# Patient Record
Sex: MALE | URBAN - METROPOLITAN AREA
[De-identification: names, ages, dates, MRNs, and addresses within clinical notes are randomized per-mention and may not be internally consistent; named-entity substitution may affect disease eponyms.]

---

## 2024-10-08 VITALS
OXYGEN SATURATION: 97 % | SYSTOLIC BLOOD PRESSURE: 122 MMHG | HEIGHT: 66 IN | HEART RATE: 59 BPM | DIASTOLIC BLOOD PRESSURE: 66 MMHG | WEIGHT: 169.98 LBS

## 2024-10-08 NOTE — H&P ADULT - HISTORY OF PRESENT ILLNESS
Cardiologist: Dr. Zeferino Kennedy  Pharmacy:  Escort:    69M Cantonese speaking, current smoker, PMHx HTN, HLD, T2DM, mod AS, thoracic aortic ectasia, and abdominal aortic ectasia, who initially presented to his outpatient cardiologist c/o CP. He describes his pain as intermittent, non-radiating, L sided chest "pressure," occurring both w/ exertion and at rest. He explains that these episodes occur for a few minutes and resolve spontaneously. Pt also endorses SHAY w/ exercise. Of note, pt had a CXR showing coronary calcification. Pt currently denies any orthopnea, PND, palpitations, dizziness, lightheadedness, syncope, diaphoresis, LE edema, N/V/D, hematochezia, melena, hematuria, hematemesis, fever, chills, URI symptoms.     - CCTA (8/7/24): CA score 559 (74%ile). Aortic valve CA 1734. Severe mixed dz in pLAD. Mod mixed dz in mLAD. Severe mixed dz pLCx. Remaining non-obstructive. GABRIEL ~2.8 cm.   - TTE (8/14/24): LVEF 55-60%. Mild cLVH. GIDD. Mod AS. Mild AR. AV Pk Grad 45.7 mmHg, AV mean grad 22.2 mmHg, AV Pk allison 2.98 m/s, aortic valve area (VTI) calculated area 1.7 cm^2, GABRIEL/BSA 0.87 cm^2/m^2. Trace MR. Ectasia in aortic root. Borderline dilated ascending aorta.   - Abdominal aorta study (8/14/24): Diffuse plaque observed in the prox, mid, distal aorta. Diffuse plaque observed in right and left ICA. Borderline ectasia in prox abd aorta (2.5 cm). Dilation in mid abd aorta (2.1 cm). Dilation in left common iliac artery (1.3 cm). Mildly elevated velocity in the right common iliac artery and B/L ext iliac arteries. Mod elevated velocity in the left common iliac artery. Patent aorta, common iliac, and external iliac arteries w/ biphasic flow B/L.    Cardiologist: Dr. Zeferino Kennedy  Pharmacy:  Escort:    69M Cantonese speaking, current smoker, PMHx HTN, HLD, T2DM, mod AS, thoracic aortic ectasia, and abdominal aortic ectasia, who initially presented to his outpatient cardiologist c/o GUADALUPE. He describes his pain as intermittent, non-radiating, L sided chest "pressure," occurring both w/ exertion and at rest. He explains that these episodes occur for a few minutes and resolve spontaneously. Pt also endorses SHAY w/ exercise. Of note, pt had a CXR showing coronary calcification. Pt currently denies any orthopnea, PND, palpitations, dizziness, lightheadedness, syncope, diaphoresis, LE edema, N/V/D, hematochezia, melena, hematuria, hematemesis, fever, chills, URI symptoms.     - CCTA (8/7/24): CA score 559 (74%ile). Aortic valve CA 1734. Severe mixed dz in pLAD. Mod mixed dz in mLAD. Severe mixed dz pLCx. Remaining non-obstructive. GABRIEL ~2.8 cm.   - TTE (8/14/24): LVEF 55-60%. Mild cLVH. GIDD. Mod AS. Mild AR. AV Pk Grad 45.7 mmHg, AV mean grad 22.2 mmHg, AV Pk allison 2.98 m/s, aortic valve area (VTI) calculated area 1.7 cm^2, GABRIEL/BSA 0.87 cm^2/m^2. Trace MR. Ectasia in aortic root. Borderline dilated ascending aorta.   - Abdominal aorta study (8/14/24): Diffuse plaque observed in the prox, mid, distal aorta. Diffuse plaque observed in right and left ICA. Borderline ectasia in prox abd aorta (2.5 cm). Dilation in mid abd aorta (2.1 cm). Dilation in left common iliac artery (1.3 cm). Mildly elevated velocity in the right common iliac artery and B/L ext iliac arteries. Mod elevated velocity in the left common iliac artery. Patent aorta, common iliac, and external iliac arteries w/ biphasic flow B/L.     In light of pt risk factors, CCS class II anginal symptoms/anginal equivalents, and abnormal CCTA, pt now presents to [facility] for recommended cardiac catheterization w/ possible intervention if clinically indicated.  Cardiologist: Dr. Zeferino Kennedy  Pharmacy: [facility]  Escort: Daughter    69M Cantonese speaking, current smoker, PMHx HTN, HLD, T2DM, mod AS, thoracic aortic ectasia, and abdominal aortic ectasia, who initially presented to his outpatient cardiologist c/o GUADALUPE. He describes his pain as intermittent, non-radiating, L sided chest "pressure," occurring both w/ exertion and at rest. He explains that these episodes occur for a few minutes and resolve spontaneously. Pt also endorses SHAY w/ exercise. Of note, pt had a CXR showing coronary calcification. Pt also complains of feeling lightheaded after sitting down and then standing. Other Pt currently denies any orthopnea, PND, palpitations, syncope, diaphoresis, LE edema, N/V/D, hematochezia, melena, hematuria, hematemesis, fever, chills, URI symptoms.     - CCTA (8/7/24): CA score 559 (74%ile). Aortic valve CA 1734. Severe mixed dz in pLAD. Mod mixed dz in mLAD. Severe mixed dz pLCx. Remaining non-obstructive. GABRIEL ~2.8 cm.   - TTE (8/14/24): LVEF 55-60%. Mild cLVH. GIDD. Mod AS. Mild AR. AV Pk Grad 45.7 mmHg, AV mean grad 22.2 mmHg, AV Pk allison 2.98 m/s, aortic valve area (VTI) calculated area 1.7 cm^2, GABRIEL/BSA 0.87 cm^2/m^2. Trace MR. Ectasia in aortic root. Borderline dilated ascending aorta.   - Abdominal aorta study (8/14/24): Diffuse plaque observed in the prox, mid, distal aorta. Diffuse plaque observed in right and left ICA. Borderline ectasia in prox abd aorta (2.5 cm). Dilation in mid abd aorta (2.1 cm). Dilation in left common iliac artery (1.3 cm). Mildly elevated velocity in the right common iliac artery and B/L ext iliac arteries. Mod elevated velocity in the left common iliac artery. Patent aorta, common iliac, and external iliac arteries w/ biphasic flow B/L.     In light of pt risk factors, CCS class II anginal symptoms/anginal equivalents, and abnormal CCTA, pt now presents to [facility] for recommended cardiac catheterization w/ possible intervention if clinically indicated.

## 2024-10-08 NOTE — H&P ADULT - SPO2 (%)
Permission received to review discharge instructions and discuss private health information with  and will have someone with them after discharge   Patient states that family/friend will be with them for at least 24 hours following today's procedure.      Air Warming blanket placed on pt; turned on for comfort 97

## 2024-10-08 NOTE — H&P ADULT - ASSESSMENT
9M Cantonese speaking, current smoker, PMHx HTN, HLD, T2DM, mod AS, thoracic aortic ectasia, and abdominal aortic ectasia, who initially presented to his outpatient cardiologist c/o GUADALUPE. He describes his pain as intermittent, non-radiating, L sided chest "pressure," occurring both w/ exertion and at rest. He explains that these episodes occur for a few minutes and resolve spontaneously. Pt also endorses SHAY w/ exercise. Of note, pt had a CXR showing coronary calcification. Pt also complains of feeling lightheaded after sitting down and then standing. In light of pt risk factors, CCS class II anginal symptoms/anginal equivalents, and abnormal CCTA, pt now presents to [facility] for recommended cardiac catheterization w/ possible intervention if clinically indicated.         ASA III          Mallampati III  Patient is a candidate for sedation: yes  Sedation: Moderate    Risks & benefits of procedure and alternative therapy have been explained to the patient including but not limited to: allergic reaction, bleeding w/possible need for blood transfusion, infection, renal and vascular compromise, limb damage, arrhythmia, stroke, vessel dissection/perforation, Myocardial infarction, emergent CABG. Informed consent obtained and in chart.       Patient denies bleeding, BRBPR, melena, hematuria, hematemesis.        cc Bolus IV x 1 over 1 hour followed by NS 75 cc/hr x 2 hrs per Hydration Protocol. Per Echo (8/14/24) Pt has mod A.S but has normal LVEF (55-60%). No evidence of fluid overload. No evidence of JVD, pulmonary congestion, LE edema, or ascites.    - Pt endorses being prescribed ASA x2 years ago. He states that he is not compliant with taking his Aspirin every day due to developing occasional epistaxis. States that he last took his ASA 81 medication x3 days ago. Pt loaded with  mg x1. No plavix given prior to cath, discussed with . H&H, coags, LFTs, and platelets WNL.    9M Cantonese speaking, current smoker, PMHx HTN, HLD, T2DM, mod AS, thoracic aortic ectasia, and abdominal aortic ectasia, who initially presented to his outpatient cardiologist c/o GUADALUPE. He describes his pain as intermittent, non-radiating, L sided chest "pressure," occurring both w/ exertion and at rest. He explains that these episodes occur for a few minutes and resolve spontaneously. Pt also endorses SHAY w/ exercise. Of note, pt had a CXR showing coronary calcification. Pt also complains of feeling lightheaded after sitting down and then standing. In light of pt risk factors, CCS class II-III anginal symptoms/anginal equivalents, and abnormal CCTA, pt now presents to [facility] for recommended cardiac catheterization w/ possible intervention if clinically indicated.         ASA III          Mallampati III  Patient is a candidate for sedation: yes  Sedation: Moderate    Risks & benefits of procedure and alternative therapy have been explained to the patient including but not limited to: allergic reaction, bleeding w/possible need for blood transfusion, infection, renal and vascular compromise, limb damage, arrhythmia, stroke, vessel dissection/perforation, Myocardial infarction, emergent CABG. Informed consent obtained and in chart.       Patient denies bleeding, BRBPR, melena, hematuria, hematemesis.        cc Bolus IV x 1 over 1 hour followed by NS 75 cc/hr x 2 hrs per Hydration Protocol. Per Echo (8/14/24) Pt has mod A.S but has normal LVEF (55-60%). No evidence of fluid overload. No evidence of JVD, pulmonary congestion, LE edema, or ascites.    - Pt endorses being prescribed ASA x2 years ago. He states that he is not compliant with taking his Aspirin every day due to developing occasional epistaxis. States that he last took his ASA 81 medication x3 days ago. Pt loaded with  mg x1. No plavix given prior to cath, discussed with . H&H, coags, LFTs, and platelets WNL.  Of note, the patient  did have epistaxis after taking ASA for 10 days lately and has been taking it on and off.  However, he denies itchiness, swelling or SOB with ASA.  Clinical history is not consistent ASA allergy.  Given his anatomy of multi-vessel CAD and epistaxis on ASA, treatment options including continue medical therapy vs CABG vs PCI discussed with pt and family again at bedside.   They expressed strong preference for PCI over CABG.  He also promised to continue ASA and Plavix if PCI is performed.

## 2024-10-08 NOTE — H&P ADULT - CARDIOVASCULAR
normal/regular rate and rhythm/S1 S2 present/no gallops/no rub/no JVD/no pedal edema/murmur/vascular

## 2024-10-08 NOTE — H&P ADULT - NSICDXPASTMEDICALHX_GEN_ALL_CORE_FT
PAST MEDICAL HISTORY:  History of nicotine dependence     HLD (hyperlipidemia)     HTN (hypertension)     T2DM (type 2 diabetes mellitus)

## 2024-10-10 ENCOUNTER — OUTPATIENT (OUTPATIENT)
Dept: OUTPATIENT SERVICES | Facility: HOSPITAL | Age: 69
LOS: 1 days | Discharge: HOME | End: 2024-10-10
Payer: MEDICARE

## 2024-10-10 LAB
A1C WITH ESTIMATED AVERAGE GLUCOSE RESULT: 6.6 % — HIGH (ref 4–5.6)
ALBUMIN SERPL ELPH-MCNC: 4.4 G/DL — SIGNIFICANT CHANGE UP (ref 3.3–5)
ALP SERPL-CCNC: 68 U/L — SIGNIFICANT CHANGE UP (ref 40–120)
ALT FLD-CCNC: 19 U/L — SIGNIFICANT CHANGE UP (ref 10–45)
ANION GAP SERPL CALC-SCNC: 9 MMOL/L — SIGNIFICANT CHANGE UP (ref 5–17)
APTT BLD: 30.7 SEC — SIGNIFICANT CHANGE UP (ref 24.5–35.6)
AST SERPL-CCNC: 15 U/L — SIGNIFICANT CHANGE UP (ref 10–40)
BASOPHILS # BLD AUTO: 0.05 K/UL — SIGNIFICANT CHANGE UP (ref 0–0.2)
BASOPHILS NFR BLD AUTO: 0.8 % — SIGNIFICANT CHANGE UP (ref 0–2)
BILIRUB SERPL-MCNC: 0.6 MG/DL — SIGNIFICANT CHANGE UP (ref 0.2–1.2)
BUN SERPL-MCNC: 26 MG/DL — HIGH (ref 7–23)
CALCIUM SERPL-MCNC: 9.5 MG/DL — SIGNIFICANT CHANGE UP (ref 8.4–10.5)
CHLORIDE SERPL-SCNC: 107 MMOL/L — SIGNIFICANT CHANGE UP (ref 96–108)
CHOLEST SERPL-MCNC: 169 MG/DL — SIGNIFICANT CHANGE UP
CK MB CFR SERPL CALC: <1 NG/ML — SIGNIFICANT CHANGE UP (ref 0–6.7)
CK SERPL-CCNC: 36 U/L — SIGNIFICANT CHANGE UP (ref 30–200)
CO2 SERPL-SCNC: 27 MMOL/L — SIGNIFICANT CHANGE UP (ref 22–31)
CREAT SERPL-MCNC: 1.08 MG/DL — SIGNIFICANT CHANGE UP (ref 0.5–1.3)
EGFR: 75 ML/MIN/1.73M2 — SIGNIFICANT CHANGE UP
EOSINOPHIL # BLD AUTO: 0.21 K/UL — SIGNIFICANT CHANGE UP (ref 0–0.5)
EOSINOPHIL NFR BLD AUTO: 3.4 % — SIGNIFICANT CHANGE UP (ref 0–6)
ESTIMATED AVERAGE GLUCOSE: 143 MG/DL — HIGH (ref 68–114)
GLUCOSE BLDC GLUCOMTR-MCNC: 120 MG/DL — HIGH (ref 70–99)
GLUCOSE BLDC GLUCOMTR-MCNC: 136 MG/DL — HIGH (ref 70–99)
GLUCOSE SERPL-MCNC: 137 MG/DL — HIGH (ref 70–99)
HCT VFR BLD CALC: 44.4 % — SIGNIFICANT CHANGE UP (ref 39–50)
HDLC SERPL-MCNC: 63 MG/DL — SIGNIFICANT CHANGE UP
HGB BLD-MCNC: 14.4 G/DL — SIGNIFICANT CHANGE UP (ref 13–17)
IMM GRANULOCYTES NFR BLD AUTO: 1.1 % — HIGH (ref 0–0.9)
INR BLD: 0.89 — SIGNIFICANT CHANGE UP (ref 0.85–1.16)
LIPID PNL WITH DIRECT LDL SERPL: 81 MG/DL — SIGNIFICANT CHANGE UP
LYMPHOCYTES # BLD AUTO: 2.49 K/UL — SIGNIFICANT CHANGE UP (ref 1–3.3)
LYMPHOCYTES # BLD AUTO: 40.7 % — SIGNIFICANT CHANGE UP (ref 13–44)
MAGNESIUM SERPL-MCNC: 2.4 MG/DL — SIGNIFICANT CHANGE UP (ref 1.6–2.6)
MCHC RBC-ENTMCNC: 30.6 PG — SIGNIFICANT CHANGE UP (ref 27–34)
MCHC RBC-ENTMCNC: 32.4 GM/DL — SIGNIFICANT CHANGE UP (ref 32–36)
MCV RBC AUTO: 94.5 FL — SIGNIFICANT CHANGE UP (ref 80–100)
MONOCYTES # BLD AUTO: 0.57 K/UL — SIGNIFICANT CHANGE UP (ref 0–0.9)
MONOCYTES NFR BLD AUTO: 9.3 % — SIGNIFICANT CHANGE UP (ref 2–14)
NEUTROPHILS # BLD AUTO: 2.73 K/UL — SIGNIFICANT CHANGE UP (ref 1.8–7.4)
NEUTROPHILS NFR BLD AUTO: 44.7 % — SIGNIFICANT CHANGE UP (ref 43–77)
NON HDL CHOLESTEROL: 106 MG/DL — SIGNIFICANT CHANGE UP
NRBC # BLD: 0 /100 WBCS — SIGNIFICANT CHANGE UP (ref 0–0)
PLATELET # BLD AUTO: 237 K/UL — SIGNIFICANT CHANGE UP (ref 150–400)
POTASSIUM SERPL-MCNC: 4.2 MMOL/L — SIGNIFICANT CHANGE UP (ref 3.5–5.3)
POTASSIUM SERPL-SCNC: 4.2 MMOL/L — SIGNIFICANT CHANGE UP (ref 3.5–5.3)
PROT SERPL-MCNC: 7.4 G/DL — SIGNIFICANT CHANGE UP (ref 6–8.3)
PROTHROM AB SERPL-ACNC: 10.3 SEC — SIGNIFICANT CHANGE UP (ref 9.9–13.4)
RBC # BLD: 4.7 M/UL — SIGNIFICANT CHANGE UP (ref 4.2–5.8)
RBC # FLD: 13.2 % — SIGNIFICANT CHANGE UP (ref 10.3–14.5)
SODIUM SERPL-SCNC: 143 MMOL/L — SIGNIFICANT CHANGE UP (ref 135–145)
TRIGL SERPL-MCNC: 145 MG/DL — SIGNIFICANT CHANGE UP
WBC # BLD: 6.12 K/UL — SIGNIFICANT CHANGE UP (ref 3.8–10.5)
WBC # FLD AUTO: 6.12 K/UL — SIGNIFICANT CHANGE UP (ref 3.8–10.5)

## 2024-10-10 PROCEDURE — 82550 ASSAY OF CK (CPK): CPT

## 2024-10-10 PROCEDURE — 80061 LIPID PANEL: CPT

## 2024-10-10 PROCEDURE — 80053 COMPREHEN METABOLIC PANEL: CPT

## 2024-10-10 PROCEDURE — 93005 ELECTROCARDIOGRAM TRACING: CPT

## 2024-10-10 PROCEDURE — 93458 L HRT ARTERY/VENTRICLE ANGIO: CPT

## 2024-10-10 PROCEDURE — 36415 COLL VENOUS BLD VENIPUNCTURE: CPT

## 2024-10-10 PROCEDURE — 83735 ASSAY OF MAGNESIUM: CPT

## 2024-10-10 PROCEDURE — 82962 GLUCOSE BLOOD TEST: CPT

## 2024-10-10 PROCEDURE — 85025 COMPLETE CBC W/AUTO DIFF WBC: CPT

## 2024-10-10 PROCEDURE — C1894: CPT

## 2024-10-10 PROCEDURE — 85610 PROTHROMBIN TIME: CPT

## 2024-10-10 PROCEDURE — C1887: CPT

## 2024-10-10 PROCEDURE — 82553 CREATINE MB FRACTION: CPT

## 2024-10-10 PROCEDURE — 93010 ELECTROCARDIOGRAM REPORT: CPT

## 2024-10-10 PROCEDURE — C1769: CPT

## 2024-10-10 PROCEDURE — 83036 HEMOGLOBIN GLYCOSYLATED A1C: CPT

## 2024-10-10 PROCEDURE — 85730 THROMBOPLASTIN TIME PARTIAL: CPT

## 2024-10-10 PROCEDURE — 85347 COAGULATION TIME ACTIVATED: CPT

## 2024-10-10 PROCEDURE — 93799 UNLISTED CV SVC/PROCEDURE: CPT

## 2024-10-10 RX ORDER — SODIUM CHLORIDE 0.9 % (FLUSH) 0.9 %
1000 SYRINGE (ML) INJECTION
Refills: 0 | Status: ACTIVE | OUTPATIENT
Start: 2024-10-10 | End: 2024-10-10

## 2024-10-10 RX ORDER — NEBIVOLOL 2.5 MG/1
1 TABLET ORAL
Refills: 0 | DISCHARGE

## 2024-10-10 RX ORDER — METFORMIN HCL 500 MG
1 TABLET ORAL
Refills: 0 | DISCHARGE

## 2024-10-10 RX ORDER — ASPIRIN 325 MG
81 TABLET ORAL ONCE
Refills: 0 | Status: DISCONTINUED | OUTPATIENT
Start: 2024-10-10 | End: 2024-10-10

## 2024-10-10 RX ORDER — ROSUVASTATIN CALCIUM 20 MG/1
1 TABLET, COATED ORAL
Refills: 0 | DISCHARGE

## 2024-10-10 RX ORDER — ASPIRIN 325 MG
325 TABLET ORAL ONCE
Refills: 0 | Status: COMPLETED | OUTPATIENT
Start: 2024-10-10 | End: 2024-10-10

## 2024-10-10 RX ORDER — CHLORHEXIDINE GLUCONATE ORAL RINSE 1.2 MG/ML
1 SOLUTION DENTAL ONCE
Refills: 0 | Status: ACTIVE | OUTPATIENT
Start: 2024-10-10

## 2024-10-10 RX ORDER — ASPIRIN 325 MG
1 TABLET ORAL
Refills: 0 | DISCHARGE

## 2024-10-10 RX ORDER — AMLODIPINE BESYLATE 5 MG
1 TABLET ORAL
Refills: 0 | DISCHARGE

## 2024-10-10 RX ORDER — LOSARTAN POTASSIUM 100 MG/1
1 TABLET, FILM COATED ORAL
Refills: 0 | DISCHARGE

## 2024-10-10 RX ORDER — SODIUM CHLORIDE 0.9 % (FLUSH) 0.9 %
250 SYRINGE (ML) INJECTION ONCE
Refills: 0 | Status: COMPLETED | OUTPATIENT
Start: 2024-10-10 | End: 2024-10-10

## 2024-10-10 RX ADMIN — Medication 75 MILLILITER(S): at 09:42

## 2024-10-10 RX ADMIN — Medication 250 MILLILITER(S): at 09:42

## 2024-10-10 RX ADMIN — Medication 325 MILLIGRAM(S): at 09:51

## 2024-10-10 NOTE — PROGRESS NOTE ADULT - SUBJECTIVE AND OBJECTIVE BOX
Interventional Cardiology PA SDA Discharge Note    Patient without complaints. Ambulated and voided without difficulties    Afebrile, VSS    Ext: Right Radial :  No  hematoma,  no   bleeding, dressing; C/D/I    Pulses:    intact RAD to baseline     A/P:    69M Cantonese speaking, current smoker, PMHx HTN, HLD, T2DM, mod AS, thoracic aortic ectasia, and abdominal aortic ectasia, who initially presented to his outpatient cardiologist c/o CP. He describes his pain as intermittent, non-radiating, L sided chest "pressure," occurring both w/ exertion and at rest. He explains that these episodes occur for a few minutes and resolve spontaneously. Pt also endorses SHAY w/ exercise. Of note, pt had a CXR showing coronary calcification. Pt also complains of feeling lightheaded after sitting down and then standing. Other Pt currently denies any orthopnea, PND, palpitations, syncope, diaphoresis, LE edema, N/V/D, hematochezia, melena, hematuria, hematemesis, fever, chills, URI symptoms.     In light of pt risk factors, CCS class II anginal symptoms/anginal equivalents, and abnormal CCTA, pt now presents to [facility] for recommended cardiac catheterization w/ possible intervention if clinically indicated.     Patient is now s/p cardiac cath 10/10/24: LM: normal, pLAD: 50%, IFR: 0.91, FFR: 0.83, mRA: 30-40%, mLAD: 30-40%, LVc: 30-40%, EF: 55%, aortic mean gradient: 20, mild-moderate AS; no MR; EDP: 10; R TR at 2:30       .	Stable for discharge as per attending Dr. Kennedy, after bed rest, pt voids, groin/wrist stable and 30 minutes of ambulation.  2.	Follow-up with Cardiologist, Dr. Kennedy, in 1-2 weeks  3.	Discharged forms signed and copies in chart    Interventional Cardiology PA SDA Discharge Note    Patient without complaints. Ambulated and voided without difficulties    Afebrile, VSS    Ext: Right Radial :  No  hematoma,  no   bleeding, dressing; C/D/I    Pulses:    intact RAD to baseline     A/P:    69M Cantonese speaking, current smoker, PMHx HTN, HLD, T2DM, mod AS, thoracic aortic ectasia, and abdominal aortic ectasia, who initially presented to his outpatient cardiologist c/o CP. He describes his pain as intermittent, non-radiating, L sided chest "pressure," occurring both w/ exertion and at rest. He explains that these episodes occur for a few minutes and resolve spontaneously. Pt also endorses SHAY w/ exercise. Of note, pt had a CXR showing coronary calcification. Pt also complains of feeling lightheaded after sitting down and then standing. Other Pt currently denies any orthopnea, PND, palpitations, syncope, diaphoresis, LE edema, N/V/D, hematochezia, melena, hematuria, hematemesis, fever, chills, URI symptoms.     In light of pt risk factors, CCS class II anginal symptoms/anginal equivalents, and abnormal CCTA, pt now presents to [facility] for recommended cardiac catheterization w/ possible intervention if clinically indicated.     Patient is now s/p cardiac cath 10/10/24: LM: normal, pLAD: 50%, IFR: 0.91, FFR: 0.83, mRA: 30-40%, mLAD: 30-40%, LVc: 30-40%, EF: 55%, aortic mean gradient: 20, mild-moderate AS; no MR; EDP: 10; R TR at 2:30       1.	Stable for discharge as per attending Dr. Kennedy, after bed rest, pt voids, groin/wrist stable and 30 minutes of ambulation.  2.	Follow-up with Cardiologist, Dr. Kennedy, in 1-2 weeks  3.	Discharged forms signed and copies in chart

## 2024-10-11 DIAGNOSIS — I25.110 ATHEROSCLEROTIC HEART DISEASE OF NATIVE CORONARY ARTERY WITH UNSTABLE ANGINA PECTORIS: ICD-10-CM
